# Patient Record
Sex: MALE | URBAN - METROPOLITAN AREA
[De-identification: names, ages, dates, MRNs, and addresses within clinical notes are randomized per-mention and may not be internally consistent; named-entity substitution may affect disease eponyms.]

---

## 2021-05-05 ENCOUNTER — OFFICE VISIT (OUTPATIENT)
Dept: URBAN - METROPOLITAN AREA CLINIC 30 | Facility: CLINIC | Age: 81
End: 2021-05-05
Payer: MEDICARE

## 2021-05-05 DIAGNOSIS — H31.012 MACULA SCAR OF POST POLE OF LT EYE: ICD-10-CM

## 2021-05-05 PROCEDURE — 92004 COMPRE OPH EXAM NEW PT 1/>: CPT | Performed by: OPTOMETRIST

## 2021-05-05 PROCEDURE — 92134 CPTRZ OPH DX IMG PST SGM RTA: CPT | Performed by: OPTOMETRIST

## 2021-05-05 ASSESSMENT — KERATOMETRY
OD: 43.33
OS: 43.86

## 2021-05-05 ASSESSMENT — VISUAL ACUITY: OD: 20/40

## 2021-05-05 ASSESSMENT — INTRAOCULAR PRESSURE
OD: 17
OS: 15

## 2021-05-05 NOTE — IMPRESSION/PLAN
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 Plan: Per history pt had fluid in my retina that resolved. Suspect old . Does have some hard drusen. Monitor. MAC OCT today.

## 2021-05-05 NOTE — IMPRESSION/PLAN
Impression: Open angle with borderline findings, high risk, bilateral: H40.023. -FHx Plan: C/D asymmetry,  thin inf rim of disc OS. Rec baseline diagnostics.

## 2021-05-05 NOTE — IMPRESSION/PLAN
Impression: Combined forms of age-related cataract, bilateral: H25.813. Plan: Discussed cataracts, decided to wait on tx for now. Hold spec rx, no real change.

## 2021-07-22 ENCOUNTER — OFFICE VISIT (OUTPATIENT)
Dept: URBAN - METROPOLITAN AREA CLINIC 30 | Facility: CLINIC | Age: 81
End: 2021-07-22
Payer: MEDICARE

## 2021-07-22 DIAGNOSIS — H25.813 COMBINED FORMS OF AGE-RELATED CATARACT, BILATERAL: ICD-10-CM

## 2021-07-22 DIAGNOSIS — H40.023 OPEN ANGLE WITH BORDERLINE FINDINGS, HIGH RISK, BILATERAL: Primary | ICD-10-CM

## 2021-07-22 PROCEDURE — 76514 ECHO EXAM OF EYE THICKNESS: CPT | Performed by: OPTOMETRIST

## 2021-07-22 PROCEDURE — 92083 EXTENDED VISUAL FIELD XM: CPT | Performed by: OPTOMETRIST

## 2021-07-22 PROCEDURE — 92133 CPTRZD OPH DX IMG PST SGM ON: CPT | Performed by: OPTOMETRIST

## 2021-07-22 PROCEDURE — 99213 OFFICE O/P EST LOW 20 MIN: CPT | Performed by: OPTOMETRIST

## 2021-07-22 ASSESSMENT — INTRAOCULAR PRESSURE
OD: 14
OS: 14

## 2021-07-22 NOTE — IMPRESSION/PLAN
Impression: Combined forms of age-related cataract, bilateral: H25.813. Plan: Patient will consider cataract surgery.

## 2021-07-22 NOTE — IMPRESSION/PLAN
Impression: Open angle with borderline findings, high risk, bilateral: H40.023. -FHx PACHS 554/563 Plan: C/D asymmetry,  thin inf rim of disc OS. IOPs today, 14 OU. RNFL 7/21 normal NFL OU (75/74) VF 7/21 highly unreliable. Repeat in 6 months. Monitor s tx.

## 2025-02-26 ENCOUNTER — OFFICE VISIT (OUTPATIENT)
Dept: URBAN - METROPOLITAN AREA CLINIC 23 | Facility: CLINIC | Age: 85
End: 2025-02-26
Payer: COMMERCIAL

## 2025-02-26 DIAGNOSIS — H04.123 DRY EYE SYNDROME OF BILATERAL LACRIMAL GLANDS: ICD-10-CM

## 2025-02-26 DIAGNOSIS — H11.013 AMYLOID PTERYGIUM OF EYE, BILATERAL: ICD-10-CM

## 2025-02-26 DIAGNOSIS — H25.813 COMBINED FORMS OF AGE-RELATED CATARACT, BILATERAL: Primary | ICD-10-CM

## 2025-02-26 DIAGNOSIS — S05.92XA UNSPECIFIED INJURY OF LEFT EYE AND ORBIT, INITIAL ENCOUNTER: ICD-10-CM

## 2025-02-26 PROCEDURE — 99204 OFFICE O/P NEW MOD 45 MIN: CPT

## 2025-02-26 PROCEDURE — 92134 CPTRZ OPH DX IMG PST SGM RTA: CPT

## 2025-02-26 ASSESSMENT — INTRAOCULAR PRESSURE
OD: 14
OS: 14

## 2025-02-26 ASSESSMENT — KERATOMETRY
OD: 43.50
OS: 43.63

## 2025-03-26 ENCOUNTER — OFFICE VISIT (OUTPATIENT)
Dept: URBAN - METROPOLITAN AREA CLINIC 23 | Facility: CLINIC | Age: 85
End: 2025-03-26
Payer: COMMERCIAL

## 2025-03-26 DIAGNOSIS — H25.813 COMBINED FORMS OF AGE-RELATED CATARACT, BILATERAL: Primary | ICD-10-CM

## 2025-03-26 PROCEDURE — 99204 OFFICE O/P NEW MOD 45 MIN: CPT | Performed by: OPHTHALMOLOGY

## 2025-03-26 RX ORDER — OFLOXACIN 3 MG/ML
0.3 % SOLUTION/ DROPS OPHTHALMIC
Qty: 5 | Refills: 1 | Status: ACTIVE
Start: 2025-03-26

## 2025-03-26 RX ORDER — PREDNISOLONE ACETATE 10 MG/ML
1 % SUSPENSION/ DROPS OPHTHALMIC
Qty: 10 | Refills: 1 | Status: ACTIVE
Start: 2025-03-26

## 2025-03-26 ASSESSMENT — KERATOMETRY
OD: 43.63
OS: 43.75

## 2025-03-26 ASSESSMENT — VISUAL ACUITY
OS: CF 5FT
OD: 20/70

## 2025-03-26 ASSESSMENT — INTRAOCULAR PRESSURE
OD: 14
OS: 14

## 2025-04-04 ENCOUNTER — TECH ONLY (OUTPATIENT)
Dept: URBAN - METROPOLITAN AREA CLINIC 23 | Facility: CLINIC | Age: 85
End: 2025-04-04
Payer: COMMERCIAL

## 2025-04-04 DIAGNOSIS — H25.813 COMBINED FORMS OF AGE-RELATED CATARACT, BILATERAL: Primary | ICD-10-CM

## 2025-04-04 ASSESSMENT — PACHYMETRY
OD: 3.26
OS: 3.25
OD: 25.41
OS: 25.26

## 2025-04-30 ENCOUNTER — SURGERY (OUTPATIENT)
Facility: LOCATION | Age: 85
End: 2025-04-30
Payer: COMMERCIAL

## 2025-04-30 PROCEDURE — CRBAL CREDIT BALANCE: CUSTOM | Performed by: OPHTHALMOLOGY

## 2025-04-30 PROCEDURE — 66984 XCAPSL CTRC RMVL W/O ECP: CPT | Performed by: OPHTHALMOLOGY

## 2025-05-01 ENCOUNTER — POST-OPERATIVE VISIT (OUTPATIENT)
Dept: URBAN - METROPOLITAN AREA CLINIC 23 | Facility: CLINIC | Age: 85
End: 2025-05-01
Payer: COMMERCIAL

## 2025-05-01 DIAGNOSIS — Z48.810 ENCOUNTER FOR SURGICAL AFTERCARE FOLLOWING SURGERY ON A SENSE ORGAN: Primary | ICD-10-CM

## 2025-05-01 PROCEDURE — 99024 POSTOP FOLLOW-UP VISIT: CPT

## 2025-05-01 ASSESSMENT — INTRAOCULAR PRESSURE
OS: 14
OD: 15

## 2025-05-08 ENCOUNTER — POST-OPERATIVE VISIT (OUTPATIENT)
Dept: URBAN - METROPOLITAN AREA CLINIC 23 | Facility: CLINIC | Age: 85
End: 2025-05-08
Payer: COMMERCIAL

## 2025-05-08 DIAGNOSIS — Z48.810 ENCOUNTER FOR SURGICAL AFTERCARE FOLLOWING SURGERY ON A SENSE ORGAN: Primary | ICD-10-CM

## 2025-05-08 PROCEDURE — 99024 POSTOP FOLLOW-UP VISIT: CPT

## 2025-05-08 ASSESSMENT — INTRAOCULAR PRESSURE
OS: 15
OD: 15

## 2025-05-29 ENCOUNTER — POST-OPERATIVE VISIT (OUTPATIENT)
Dept: URBAN - METROPOLITAN AREA CLINIC 23 | Facility: CLINIC | Age: 85
End: 2025-05-29
Payer: COMMERCIAL

## 2025-05-29 DIAGNOSIS — Z48.810 ENCOUNTER FOR SURGICAL AFTERCARE FOLLOWING SURGERY ON A SENSE ORGAN: Primary | ICD-10-CM

## 2025-05-29 PROCEDURE — 99024 POSTOP FOLLOW-UP VISIT: CPT

## 2025-05-29 ASSESSMENT — INTRAOCULAR PRESSURE
OS: 11
OD: 10